# Patient Record
Sex: FEMALE | Race: WHITE
[De-identification: names, ages, dates, MRNs, and addresses within clinical notes are randomized per-mention and may not be internally consistent; named-entity substitution may affect disease eponyms.]

---

## 2018-02-28 ENCOUNTER — HOSPITAL ENCOUNTER (EMERGENCY)
Dept: HOSPITAL 91 - E/R | Age: 53
Discharge: HOME | End: 2018-02-28
Payer: MEDICAID

## 2018-02-28 ENCOUNTER — HOSPITAL ENCOUNTER (EMERGENCY)
Age: 53
Discharge: HOME | End: 2018-02-28

## 2018-02-28 DIAGNOSIS — M79.672: Primary | ICD-10-CM

## 2018-02-28 PROCEDURE — 99283 EMERGENCY DEPT VISIT LOW MDM: CPT

## 2018-02-28 PROCEDURE — 73630 X-RAY EXAM OF FOOT: CPT

## 2018-02-28 RX ADMIN — IBUPROFEN 1 MG: 600 TABLET ORAL at 15:54

## 2018-03-14 ENCOUNTER — HOSPITAL ENCOUNTER (EMERGENCY)
Dept: HOSPITAL 91 - FTE | Age: 53
Discharge: HOME | End: 2018-03-14
Payer: MEDICAID

## 2018-03-14 ENCOUNTER — HOSPITAL ENCOUNTER (EMERGENCY)
Age: 53
Discharge: HOME | End: 2018-03-14

## 2018-03-14 DIAGNOSIS — M72.2: Primary | ICD-10-CM

## 2018-03-14 PROCEDURE — 99284 EMERGENCY DEPT VISIT MOD MDM: CPT

## 2018-03-14 PROCEDURE — 96372 THER/PROPH/DIAG INJ SC/IM: CPT

## 2018-03-14 RX ADMIN — KETOROLAC TROMETHAMINE 1 MG: 30 INJECTION, SOLUTION INTRAMUSCULAR at 20:52

## 2019-01-07 ENCOUNTER — HOSPITAL ENCOUNTER (EMERGENCY)
Dept: HOSPITAL 10 - FTE | Age: 54
Discharge: HOME | End: 2019-01-07
Payer: MEDICAID

## 2019-01-07 ENCOUNTER — HOSPITAL ENCOUNTER (EMERGENCY)
Dept: HOSPITAL 91 - FTE | Age: 54
Discharge: HOME | End: 2019-01-07
Payer: MEDICAID

## 2019-01-07 VITALS
WEIGHT: 138.01 LBS | BODY MASS INDEX: 22.18 KG/M2 | BODY MASS INDEX: 22.18 KG/M2 | HEIGHT: 66 IN | HEIGHT: 66 IN | WEIGHT: 138.01 LBS

## 2019-01-07 VITALS — DIASTOLIC BLOOD PRESSURE: 75 MMHG | SYSTOLIC BLOOD PRESSURE: 151 MMHG | HEART RATE: 69 BPM | RESPIRATION RATE: 20 BRPM

## 2019-01-07 DIAGNOSIS — R19.7: ICD-10-CM

## 2019-01-07 DIAGNOSIS — R11.2: Primary | ICD-10-CM

## 2019-01-07 PROCEDURE — 99283 EMERGENCY DEPT VISIT LOW MDM: CPT

## 2019-01-07 RX ADMIN — ONDANSETRON 1 MG: 4 TABLET, ORALLY DISINTEGRATING ORAL at 13:13

## 2019-01-07 NOTE — ERD
ER Documentation


Chief Complaint


Chief Complaint





Complains of vomiting and diarrhea x 2 days





HPI


53-year-old female presents for nausea vomiting diarrhea times 2 days.  She 


states that about 2 episodes of vomiting per day.  She said 6 episodes of 


diarrhea per day.  She states that the diarrhea is watery.  No bright red blood 


or dark stools noted.  She also has associated periumbilical abdominal pain 


which is noted to be mild.  She states that the pain is worse with eating.  No 


recent travel.  No fevers or chills noted.  Denies dysuria.





ROS


All systems reviewed and are negative except as per history of present illness.





Medications


Home Meds


Active Scripts


Loperamide Hcl* (Imodium*) 2 Mg Capsule, 2 MG PO TID PRN for DIARRHEA, #15 TAB


   Prov:LINA KAHN DO         1/7/19


Ondansetron (Ondansetron Odt) 4 Mg Tab.rapdis, 4 MG PO Q6H PRN for NAUSEA AND/OR


VOMITING, #15 TAB


   Prov:LINA KAHN DO         1/7/19


Ibuprofen* (Motrin*) 600 Mg Tab, 600 MG PO Q6, #15 TAB


   Prov:FRAN HEATH PA-C         3/14/18


Ibuprofen* (Motrin*) 600 Mg Tab, 600 MG PO Q6, #30 TAB


   Prov:LINA OVALLE MD         2/28/18





Allergies


Allergies:  


Coded Allergies:  


     No Known Allergy (Unverified , 3/14/18)





PMhx/Soc


Medical and Surgical Hx:  pt denies Medical Hx, pt denies Surgical Hx


Hx Alcohol Use:  No


Hx Substance Use:  No


Hx Tobacco Use:  No


Smoking Status:  Never smoker





Physical Exam


Vitals





Vital Signs


  Date      Temp  Pulse  Resp  B/P (MAP)   Pulse Ox  O2          O2 Flow    FiO2


Time                                                 Delivery    Rate


    1/7/19  98.5     69    20      151/75       100


     11:14                          (100)





Physical Exam


Const:   No acute distress


Resp:   Clear to auscultation bilaterally


Cardio:   Regular rate and rhythm, no murmurs


Abd:    Soft, non distended. Normal bowel sounds, mild periumbilical tenderness 


to palpation, no Gold sign, no McBurney's point tenderness, no rebound or guar


ding noted.


Skin:   No petechiae or rashes


Back:   No midline or flank tenderness


Ext:    No cyanosis, or edema


Neur:   Awake and alert


Psych:    Normal Mood and Affect


Results 24 hrs





Current Medications


 Medications
   Dose
          Sig/Brian
       Start Time
   Status  Last


 (Trade)       Ordered        Route
 PRN     Stop Time              Admin
Dose


                              Reason                                Admin


 Ondansetron    4 mg           ONCE  STAT
    1/7/19        DC       



HCl
  (Zofran                 ODT
           13:01
 1/7/19


Odt)                                         13:02








Procedures/MDM


Medical Decision Making:





Differential diagnosis includes but not limited to acute gastroenteritis, 


appendicitis, cholecystitis, pancreatitis.





Patient appeared well on physical exam. Nontoxic appearing.


Abdominal examination benign.  There is low suspicion for acute abdomen.





Patient likely has An acute gastroenteritis.  Likely viral.





ED course:


Patient was given Zofran.


Symptoms improved with treatment.





Prescription(s):


Patient given prescription for Zofran.





Patient advised to follow up with PCP in 1-2 days. Patient advised to return to 


ED for new or worsening symptoms. Patient stable on discharge from the ED.








Disclaimer: Inadvertent spelling and grammatical errors are likely due to 


EHR/dictation software use and do not reflect on the overall quality of patient 


care. Also, please note that the electronic time recorded on this note does not 


necessarily reflect the actual time of the patient encounter.





Departure


Diagnosis:  


   Primary Impression:  


   Nausea vomiting and diarrhea


Condition:  Fair


Patient Instructions:  Gastroenteritis, Viral (6Y-Adult)


Referrals:  


COMM[facility]


YOU HAVE RECEIVED A MEDICAL SCREENING EXAM AND THE RESULTS INDICATE THAT YOU DO 


NOT HAVE A CONDITION THAT REQUIRES URGENT TREATMENT IN THE EMERGENCY DEPARTMENT.





FURTHER EVALUATION AND TREATMENT OF YOUR CONDITION CAN WAIT UNTIL YOU ARE SEEN 


IN YOUR DOCTORS OFFICE WITHIN THE NEXT 1-2 DAYS. IT IS YOUR RESPONSIBILITY TO 


MAKE AN APPOINTMENT FOR FOLOW-UP CARE.





IF YOU HAVE A PRIMARY DOCTOR


--you should call your primary doctor and schedule an appointment





IF YOU DO NOT HAVE A PRIMARY DOCTOR YOU CAN CALL OUR PHYSICIAN REFERRAL HOTLINE 


AT


 [phone] 





IF YOU CAN NOT AFFORD TO SEE A PHYSICIAN YOU CAN CHOSE FROM THE FOLLOWING 


[facility] CL[facility]





[facility] [phone] [address] [facility] [phone] [address] [facility]. [facility] [phone] [address] [facility] [phone] [address] [facility] [phone] [address] [facility]. [facility]. [phone] [address]





Additional Instructions:  


Llame al doctor MAANA y jimi rosie GREGORIO PARA DENTRO DE 1-2 SWEET.Dgale a la 


secretaria que nosotros le instruimos hacer esta gregorio.Avise o llame si hoang 


condicin se empeora antes de la gregorio. Regresa aqui si peor o no mejor.











LINA KAHN DO                  Jan 7, 2019 13:08

## 2019-02-25 ENCOUNTER — HOSPITAL ENCOUNTER (EMERGENCY)
Dept: HOSPITAL 91 - FTE | Age: 54
Discharge: HOME | End: 2019-02-25
Payer: MEDICAID

## 2019-02-25 ENCOUNTER — HOSPITAL ENCOUNTER (EMERGENCY)
Dept: HOSPITAL 10 - FTE | Age: 54
Discharge: HOME | End: 2019-02-25
Payer: MEDICAID

## 2019-02-25 VITALS — BODY MASS INDEX: 32.45 KG/M2 | WEIGHT: 140.21 LBS | HEIGHT: 55 IN

## 2019-02-25 VITALS — RESPIRATION RATE: 20 BRPM | SYSTOLIC BLOOD PRESSURE: 145 MMHG | DIASTOLIC BLOOD PRESSURE: 91 MMHG | HEART RATE: 84 BPM

## 2019-02-25 DIAGNOSIS — S20.212A: ICD-10-CM
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
## 2019-02-25 DIAGNOSIS — S76.012A: Primary | ICD-10-CM
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
## 2019-02-25 PROCEDURE — 99284 EMERGENCY DEPT VISIT MOD MDM: CPT

## 2019-02-25 PROCEDURE — 96372 THER/PROPH/DIAG INJ SC/IM: CPT

## 2019-02-25 PROCEDURE — 73510: CPT

## 2019-02-25 PROCEDURE — 71100 X-RAY EXAM RIBS UNI 2 VIEWS: CPT

## 2019-02-25 RX ADMIN — KETOROLAC TROMETHAMINE 1 MG: 30 INJECTION, SOLUTION INTRAMUSCULAR at 16:52

## 2019-02-25 RX ADMIN — HYDROCODONE BITARTRATE AND ACETAMINOPHEN 1 TAB: 5; 325 TABLET ORAL at 16:51

## 2019-02-25 NOTE — ERD
ER Documentation


Chief Complaint


Chief Complaint





S/P MECHANICAL FALL FROM~3.5 FEET HIGH TODAY C/O L HIP, L GROIN PAIN





HPI


53-year-old female slipped while cleaning some stairs.  She landed on her left 


side but did not fall down the stairs.  Her primary complaints of left rib pain 


and left hip pain.  She is able to ambulate although with discomfort.  Denies 


any head injury, neck pain, weakness or deficits.  There is no bleeding or 


lacerations.





ROS


All systems reviewed and are negative except as per history of present illness.





Medications


Home Meds


Active Scripts


Hydrocodone/Acetaminophen (Norco 5-325 Tablet) 1 Each Tablet, 1 TAB PO Q6H PRN 


for PAIN, #7 TAB


   Prov:ÓSCAR FREEMAN MD         2/25/19


Ibuprofen* (Motrin*) 600 Mg Tab, 600 MG PO Q6, #20 TAB


   Prov:ÓSCAR FREEMAN MD         2/25/19


Loperamide Hcl* (Imodium*) 2 Mg Capsule, 2 MG PO TID PRN for DIARRHEA, #15 TAB


   Prov:LINA KAHN DO         1/7/19


Ondansetron (Ondansetron Odt) 4 Mg Tab.rapdis, 4 MG PO Q6H PRN for NAUSEA AND/OR


VOMITING, #15 TAB


   Prov:LINA KAHN DO         1/7/19


Ibuprofen* (Motrin*) 600 Mg Tab, 600 MG PO Q6, #15 TAB


   Prov:FRAN HEATH PA-C         3/14/18


Ibuprofen* (Motrin*) 600 Mg Tab, 600 MG PO Q6, #30 TAB


   Prov:LINA OVALLE MD         2/28/18





Allergies


Allergies:  


Coded Allergies:  


     No Known Allergy (Unverified , 3/14/18)





PMhx/Soc


Medical and Surgical Hx:  pt denies Medical Hx, pt denies Surgical Hx


History of Surgery:  No


Anesthesia Reaction:  No


Hx Neurological Disorder:  No


Hx Respiratory Disorders:  No


Hx Cardiac Disorders:  No


Hx Psychiatric Problems:  No


Hx Miscellaneous Medical Probl:  No


Hx Alcohol Use:  No


Hx Substance Use:  No


Hx Tobacco Use:  No


Smoking Status:  Never smoker





FmHx


Family History:  No diabetes, No coronary disease, No other





Physical Exam


Vitals





Vital Signs


  Date      Temp  Pulse  Resp  B/P (MAP)   Pulse Ox  O2          O2 Flow    FiO2


Time                                                 Delivery    Rate


   2/25/19  99.1     84    20      145/91        98


     16:09                          (109)





Physical Exam


Const:   No acute distress


Head:   Atraumatic 


Eyes:    Normal Conjunctiva


ENT:    Normal External Ears, Nose and Mouth.


Neck:               Full range of motion. No meningismus.


Resp:   Clear to auscultation bilaterally


Cardio:   Regular rate and rhythm, no murmurs.  Tenderness left approximately 


T10 area without crepitance, deformities, ecchymosis.


Abd:    Soft, non tender, non distended. Normal bowel sounds


Skin:   No petechiae or rashes


Back:   No midline or flank tenderness


Ext:    No cyanosis, or edema.  Pain passive range of motion of the left hip.  


Patient is able to ablate all with the with discomfort.  No appreciable focal 


neurologic deficits.


Neur:   Awake and alert


Psych:    Normal Mood and Affect


Results 24 hrs





Current Medications


 Medications
   Dose
          Sig/Brian
       Start Time
   Status  Last


 (Trade)       Ordered        Route
 PRN     Stop Time              Admin
Dose


                              Reason                                Admin


 Ketorolac
     30 mg          ONCE  STAT
    2/25/19       DC           2/25/19


Tromethamine
                 IM
            16:41
                       16:52



 (Toradol)                                   2/25/19 16:42


                1 tab          ONCE  ONCE
    2/25/19       DC           2/25/19


Acetaminophen                 PO
            17:00
                       16:51



/
                                           2/25/19 17:01


Hydrocodone


Bitart



(Norco


(5/325))








Procedures/MDM


X-ray left hip 2V Interpreted by me: 


Bones:    No fracture


Joints:       No dislocation


Foreign body:    None


 impression-normal left hip x-ray





X-ray left ribs 2V Interpreted by me:


Soft Tissue:                No acute abnormalities


Bones:                         No acute abnormalities


Mediastinum/Cardiac Silhouette/Lungs:No acute abnormalities.  Impression have a 


normal left rib x-ray





She was given Toradol 30 mg IM and Norco 500 mL.  Patient presents after 


mechanical fall signs of left rib contusion left hip strain without signs of 


fracture, dislocation, head injury, neck injury, additional complications due to


her mechanical fall today.  She will discharged home with a short course of 


Norco, ibuprofen, primary care follow-up and return precautions.  Cures review 


negative.  The patient was stable with no new complaints during the ER course. 


Clinically, there is no current evidence to suggest meningitis, sepsis, acute 


abdomen, pneumonia, stroke,  acute coronary syndrome, pulmonary embolism, aortic


dissection or any other emergent condition appearing to require further 


evaluation or hospitalization.  Patient counseled regarding my diagnostic 


impression and care plan. Prior to discharge all questions answered. Pt agrees 


with treatment plan and understands strict return precautions. Pt is instructed 


to follow up with primary care provider within 24-48 hours. Precautionary 


instructions provided including instructions to return to the ER if not 


improving or for any worsening or changing symptoms or concerns.





Departure


Diagnosis:  


   Primary Impression:  


   Hip strain


   Encounter type:  initial encounter  Laterality:  left  Qualified Codes:  


   S76.012A - Strain of muscle, fascia and tendon of left hip, initial encounter


   Additional Impressions:  


   Contusion of ribs


   Encounter type:  initial encounter  Laterality:  left  Qualified Codes:  


   S20.212A - Contusion of left front wall of thorax, initial encounter


   Fall with no significant injury


   Encounter type:  initial encounter  Qualified Codes:  W19.XXXA - Unspecified 


   fall, initial encounter


Condition:  Stable


Patient Instructions:  Fall, Mechanical, Hip Strain, Rib Contusion





Additional Instructions:  


X-rays normal.  Examines normal hoy. Cheque otro vez con hoang doctor primario en 


el proximo hernandez or regresa para mas o nueva simptomas.











ÓSCAR FREEMAN MD             Feb 25, 2019 17:59

## 2019-03-05 ENCOUNTER — HOSPITAL ENCOUNTER (EMERGENCY)
Dept: HOSPITAL 10 - FTE | Age: 54
Discharge: HOME | End: 2019-03-05
Payer: MEDICAID

## 2019-03-05 ENCOUNTER — HOSPITAL ENCOUNTER (EMERGENCY)
Dept: HOSPITAL 91 - FTE | Age: 54
Discharge: HOME | End: 2019-03-05
Payer: MEDICAID

## 2019-03-05 VITALS
WEIGHT: 138.89 LBS | HEIGHT: 62 IN | WEIGHT: 138.89 LBS | BODY MASS INDEX: 25.56 KG/M2 | HEIGHT: 62 IN | BODY MASS INDEX: 25.56 KG/M2

## 2019-03-05 VITALS — DIASTOLIC BLOOD PRESSURE: 78 MMHG | RESPIRATION RATE: 18 BRPM | SYSTOLIC BLOOD PRESSURE: 168 MMHG | HEART RATE: 77 BPM

## 2019-03-05 DIAGNOSIS — M54.5: Primary | ICD-10-CM

## 2019-03-05 PROCEDURE — 99283 EMERGENCY DEPT VISIT LOW MDM: CPT

## 2019-03-05 NOTE — ERD
ER Documentation


Chief Complaint


Chief Complaint





LEFT SIDED RIB PAIN FROM FALL 1 WEEK AGO





HPI


53-year-old female presents with left-sided rib cage pain and mid lower back 


pain that she had for about 8 days after she fell.  She was seen here at that 


time and had x-rays which were negative.  She was given prescriptions for 


ibuprofen and Norco.  She states she continues to have pain particularly in the 


left ribs it feels like a pulling.  No new recent trauma.





ROS


All systems reviewed and are negative except as per history of present illness.





Medications


Home Meds


Active Scripts


Hydrocodone/Acetaminophen (Norco 5-325 Tablet) 1 Each Tablet, 1 TAB PO Q6H PRN 


for PAIN, #15 TAB


   Prov:BOOGIE MENCHACA PA-C         3/5/19


Cyclobenzaprine Hcl* (Cyclobenzaprine Hcl*) 10 Mg Tablet, 10 MG PO TID, #15 TAB


   Prov:BOOGIE MENCHACA PA-C         3/5/19


Hydrocodone/Acetaminophen (Norco 5-325 Tablet) 1 Each Tablet, 1 TAB PO Q6H PRN 


for PAIN, #7 TAB


   Prov:ÓSCAR FREEMAN MD         2/25/19


Ibuprofen* (Motrin*) 600 Mg Tab, 600 MG PO Q6, #20 TAB


   Prov:ÓSCAR FREEMAN MD         2/25/19


Loperamide Hcl* (Imodium*) 2 Mg Capsule, 2 MG PO TID PRN for DIARRHEA, #15 TAB


   Prov:LINA KAHN DO         1/7/19


Ondansetron (Ondansetron Odt) 4 Mg Tab.rapdis, 4 MG PO Q6H PRN for NAUSEA AND/OR


VOMITING, #15 TAB


   Prov:LINA KAHN DO         1/7/19


Ibuprofen* (Motrin*) 600 Mg Tab, 600 MG PO Q6, #15 TAB


   Prov:FRAN HEATH PA-C         3/14/18


Ibuprofen* (Motrin*) 600 Mg Tab, 600 MG PO Q6, #30 TAB


   Prov:LINA OVALLE MD         2/28/18





Allergies


Allergies:  


Coded Allergies:  


     No Known Allergy (Unverified , 3/14/18)





PMhx/Soc


History of Surgery:  No


Anesthesia Reaction:  No


Hx Neurological Disorder:  No


Hx Respiratory Disorders:  No


Hx Cardiac Disorders:  No


Hx Psychiatric Problems:  No


Hx Miscellaneous Medical Probl:  No


Hx Alcohol Use:  No


Hx Substance Use:  No


Hx Tobacco Use:  No





FmHx


Family History:  No diabetes





Physical Exam


Vitals





Vital Signs


  Date      Temp  Pulse  Resp  B/P (MAP)   Pulse Ox  O2          O2 Flow    FiO2


Time                                                 Delivery    Rate


    3/5/19  99.6     77    18      168/78        99


     15:07                          (108)





Physical Exam


INITIAL VITAL SIGNS: Reviewed by me


GENERAL: Awake, alert and oriented x 4, well appearing, nontoxic, speaking in 


full sentences. No acute distress


HEAD: Atraumatic


NECK: Supple. No masses. Full range of motion.  No meningismus. No midline 


tenderness. 


 


RESPIRATORY: Clear to auscultation bilaterally. Symmetric chest wall rise.  No 


wheezing or rales. No accessory muscle use.  


CV: Regular rate and rhythm. No murmurs, rubs, or gallops.  Left lower lateral 


rib cage no step-offs or bony abdomen


 


EXTREMITIES: No clubbing or cyanosis. No edema. Moving all extremities normally.


 Back Exam:      


 


 Compartments:   Soft


 Motor:         Normal flexion and extension of bilateral hip/knee/ankle/foot


 Sensation:      Intact to light touch throughout


 Bones:       No midline TTP





Procedures/MDM


Patient has continued pain after fall 8 days ago.  I offered to redo x-rays but 


she declined.  I gave her a prescription for a few more Norco and Flexeril.  


Patient counseled regarding my diagnostic impression and care plan. Prior to 


discharge all questions answered. Pt agrees with treatment plan and understands 


strict return precautions. Pt is instructed to follow up with primary care prov


ider within 24-48 hours. Precautionary instructions provided including 


instructions to return to the ER if not improving or for any worsening or 


changing symptoms or concerns.





Departure


Diagnosis:  


   Primary Impression:  


   Back pain


   Additional Impression:  


   Rib pain


Condition:  Stable


Patient Instructions:  Back Pain (Acute Or Chronic), Rib Contusion





Additional Instructions:  


Call your primary care doctor TOMORROW for an appointment during the next 1-2 


days.See the doctor sooner or return here if your condition worsens before your 


appointment time.











BOOGIE MENCHACA PA-C             Mar 5, 2019 16:16